# Patient Record
Sex: FEMALE | Race: WHITE
[De-identification: names, ages, dates, MRNs, and addresses within clinical notes are randomized per-mention and may not be internally consistent; named-entity substitution may affect disease eponyms.]

---

## 2020-02-10 NOTE — ULT
EXAM:

RIGHT BREAST ULTRASOUND:

 

History: 

Patient presents for right breast ultrasound with attenuation to a mass in the 10 o'clock position of
 the right breast, 7 cm from the nipple on mammography. 

 

FINDINGS: 

In this region of the breast, at 10 o'clock 7 cm from the nipple, there is a circumscribed cyst measu
ring 0.7 x 0.7 x 0.8 cm in size. 

 

IMPRESSION: 

0.8 cm diameter right breast cyst, 10 o'clock 7 cm from the nipple, corresponding to the area of mamm
ographic concern. BIRADS category 2 - benign findings. Continued annual follow up screening mammograp
hy.

 

POS: OFF

## 2020-02-13 NOTE — MMO
Right Breast MAMMO Unilat Diag DDI RT+ALOK.

 

CLINICAL HISTORY:

Patient is 49 years old and is seen for diagnostic exam.

 

VIEWS:

The views performed were:  .

 

FILMS COMPARED:

The present examination has been compared to prior imaging studies performed on

02/03/2020, and at [facility] on 02/10/2020.

 

This study has been interpreted with the assistance of computer-aided detection.

 

MAMMOGRAM FINDINGS:

The breast is heterogeneously dense, which could obscure a lesion on mammography.

 

There is a round mass measuring 9 millimeters with circumscribed margins seen in

the posterior upper-outer region of the right breast.

 

Cyst on ultrasound.

 

There are no suspicious masses, suspicious calcifications, or new areas of

architectural distortion.

 

IMPRESSION:

THERE IS NO MAMMOGRAPHIC EVIDENCE OF MALIGNANCY.

 

A ROUTINE FOLLOW-UP MAMMOGRAM IN 1 YEAR IS RECOMMENDED.

 

THE RESULTS OF THIS EXAM WERE SENT TO THE PATIENT.

 

ACR BI-RADS Category 2 - Benign finding

 

MAMMOGRAPHY NOTE:

 1. A negative mammogram report should not delay a biopsy if a dominant of

 clinically suspicious mass is present.

 2. Approximately 10% to 15% of breast cancers are not detected by

 mammography.

 3. Adenosis and dense breasts may obscure an underlying neoplasm.

 

 

Reported by: DASHAWN GR MD

Electonically Signed: [id number]

## 2022-08-05 ENCOUNTER — HOSPITAL ENCOUNTER (OUTPATIENT)
Dept: HOSPITAL 92 - BICULT | Age: 52
Discharge: HOME | End: 2022-08-05
Attending: OBSTETRICS & GYNECOLOGY
Payer: COMMERCIAL

## 2022-08-05 DIAGNOSIS — N63.20: Primary | ICD-10-CM
